# Patient Record
Sex: FEMALE | URBAN - METROPOLITAN AREA
[De-identification: names, ages, dates, MRNs, and addresses within clinical notes are randomized per-mention and may not be internally consistent; named-entity substitution may affect disease eponyms.]

---

## 2022-10-06 ENCOUNTER — APPOINTMENT (RX ONLY)
Dept: URBAN - METROPOLITAN AREA CLINIC 15 | Facility: CLINIC | Age: 37
Setting detail: DERMATOLOGY
End: 2022-10-06

## 2022-10-06 VITALS — WEIGHT: 168 LBS | HEIGHT: 60 IN

## 2022-10-06 DIAGNOSIS — Z41.9 ENCOUNTER FOR PROCEDURE FOR PURPOSES OTHER THAN REMEDYING HEALTH STATE, UNSPECIFIED: ICD-10-CM

## 2022-10-06 PROCEDURE — ? COSMETIC CONSULTATION: PICOSURE LASER

## 2022-10-06 ASSESSMENT — LOCATION SIMPLE DESCRIPTION DERM
LOCATION SIMPLE: RIGHT EYEBROW
LOCATION SIMPLE: LEFT EYEBROW

## 2022-10-06 ASSESSMENT — LOCATION ZONE DERM: LOCATION ZONE: FACE

## 2022-10-06 ASSESSMENT — LOCATION DETAILED DESCRIPTION DERM
LOCATION DETAILED: LEFT CENTRAL EYEBROW
LOCATION DETAILED: RIGHT CENTRAL EYEBROW

## 2022-10-06 NOTE — HPI: COSMETIC CONSULTATION
When Outside In The Sun, Do You...: rarely burns, mostly tans
Additional History: Pt is in for cosmetic consultation for laser treatments to remove micro blading.

## 2022-10-27 ENCOUNTER — APPOINTMENT (RX ONLY)
Dept: URBAN - METROPOLITAN AREA CLINIC 15 | Facility: CLINIC | Age: 37
Setting detail: DERMATOLOGY
End: 2022-10-27

## 2022-10-27 VITALS — HEIGHT: 60 IN | WEIGHT: 162 LBS

## 2022-10-27 DIAGNOSIS — Z41.9 ENCOUNTER FOR PROCEDURE FOR PURPOSES OTHER THAN REMEDYING HEALTH STATE, UNSPECIFIED: ICD-10-CM

## 2022-10-27 PROCEDURE — ? PICOWAY LASER

## 2022-10-27 ASSESSMENT — LOCATION SIMPLE DESCRIPTION DERM
LOCATION SIMPLE: RIGHT EYEBROW
LOCATION SIMPLE: LEFT EYEBROW

## 2022-10-27 ASSESSMENT — LOCATION DETAILED DESCRIPTION DERM
LOCATION DETAILED: LEFT CENTRAL EYEBROW
LOCATION DETAILED: RIGHT CENTRAL EYEBROW

## 2022-10-27 ASSESSMENT — LOCATION ZONE DERM: LOCATION ZONE: FACE

## 2022-10-27 NOTE — PROCEDURE: [facility]
Handpiece: Zoom
Pulse Duration: 2.5 ms
Wavelength: 532 nm
Treatment Number: 0
Passes: 2
Fluence (J/Cm2): 2.8
Location Override: Eyebrows
Frequency (Hz): 6 Hz
Hide Pulse Duration?: No
Topical Anesthesia Type: PLAST (20% benzocaine, 8% lidocaine, 4% tetracaine)
Fluence (J/Cm2): 9
Frequency (Hz): [place] [address]
Consent: Written consent obtained, risks reviewed including but not limited to crusting, scabbing, blistering, scarring, darker or lighter pigmentary change, paradoxical hair regrowth, incomplete removal of hair and infection.
Price (Use Numbers Only, No Special Characters Or $): [address] [facility]
Anesthesia Type: 1% lidocaine with epinephrine and 0.25% bupivacaine in a 2:3 ration buffered with 8.4% sodium bicarbonate
Spot Size: 4.0 mm
Pre-Procedure: Prior to proceeding the treatment areas were cleaned and all present put on their eye protection.
Treatment Number: 1
Post-Care Instructions: I reviewed with the patient in detail post-care instructions. Patient should avoid sun for a minimum of 4 weeks before and after treatment.
Length Of Topical Anesthesia Application (Optional): 20 minutes
Fluence (J/Cm2): 1.2
Post-Procedure Care: Immediate endpoint: perifollicular erythema and edema. Vaseline and ice applied. Post care reviewed with patient.
Total Pulses: [address]
Detail Level: Detailed
Spot Size: 6.0 mm
Total Pulses: [address]
Anesthesia Volume In Cc: 2.5
Spot Size: 2.0 mm
Wavelength: 1064 nm

## 2022-10-27 NOTE — HPI: COSMETIC PROCEDURE
When Outside In The Sun, Do You...: mostly burns, rarely tans
Additional History: [facility] for eyebrow tattoo removal\\n1-2 sessions $200 per session

## 2022-12-08 ENCOUNTER — APPOINTMENT (RX ONLY)
Dept: URBAN - METROPOLITAN AREA CLINIC 15 | Facility: CLINIC | Age: 37
Setting detail: DERMATOLOGY
End: 2022-12-08

## 2022-12-08 VITALS — HEIGHT: 60 IN | WEIGHT: 162 LBS

## 2022-12-08 DIAGNOSIS — Z41.9 ENCOUNTER FOR PROCEDURE FOR PURPOSES OTHER THAN REMEDYING HEALTH STATE, UNSPECIFIED: ICD-10-CM

## 2022-12-08 PROCEDURE — ? PICOWAY LASER

## 2022-12-08 PROCEDURE — ? ADDITIONAL NOTES

## 2022-12-08 ASSESSMENT — LOCATION DETAILED DESCRIPTION DERM
LOCATION DETAILED: RIGHT CENTRAL EYEBROW
LOCATION DETAILED: LEFT CENTRAL EYEBROW

## 2022-12-08 ASSESSMENT — LOCATION SIMPLE DESCRIPTION DERM
LOCATION SIMPLE: LEFT EYEBROW
LOCATION SIMPLE: RIGHT EYEBROW

## 2022-12-08 ASSESSMENT — LOCATION ZONE DERM: LOCATION ZONE: FACE

## 2022-12-08 NOTE — PROCEDURE: [facility]
Handpiece: Zoom
Pulse Duration: 2.5 ms
Wavelength: 532 nm
Treatment Number: 0
Passes: 2
Fluence (J/Cm2): 3
Location Override: Eyebrows
Frequency (Hz): 6 Hz
Hide Pulse Duration?: No
Topical Anesthesia Type: PLAST (20% benzocaine, 8% lidocaine, 4% tetracaine)
Fluence (J/Cm2): 9
Frequency (Hz): [address]
Consent: Written consent obtained, risks reviewed including but not limited to crusting, scabbing, blistering, scarring, darker or lighter pigmentary change, paradoxical hair regrowth, incomplete removal of hair and infection.
Price (Use Numbers Only, No Special Characters Or $): [address] [facility]
Anesthesia Type: 1% lidocaine with epinephrine and 0.25% bupivacaine in a 2:3 ration buffered with 8.4% sodium bicarbonate
Spot Size: 4.0 mm
Pre-Procedure: Prior to proceeding the treatment areas were cleaned and all present put on their eye protection.
Treatment Number: 1
Post-Care Instructions: I reviewed with the patient in detail post-care instructions. Patient should avoid sun for a minimum of 4 weeks before and after treatment.
Length Of Topical Anesthesia Application (Optional): 20 minutes
Fluence (J/Cm2): 1.2
Post-Procedure Care: Immediate endpoint: perifollicular erythema and edema. Vaseline and ice applied. Post care reviewed with patient.
Total Pulses: [address]
Detail Level: Detailed
Spot Size: 6.0 mm
Total Pulses: [address] [facility]
Anesthesia Volume In Cc: 2.5
Spot Size: 2.0 mm
Wavelength: 1064 nm

## 2022-12-08 NOTE — PROCEDURE: ADDITIONAL NOTES
Detail Level: Zone
Additional Notes: .\\n\\n- Eyebrows tattoo removal $200 [facility]
Render Risk Assessment In Note?: no